# Patient Record
Sex: FEMALE | Race: BLACK OR AFRICAN AMERICAN | NOT HISPANIC OR LATINO | URBAN - METROPOLITAN AREA
[De-identification: names, ages, dates, MRNs, and addresses within clinical notes are randomized per-mention and may not be internally consistent; named-entity substitution may affect disease eponyms.]

---

## 2020-01-30 ENCOUNTER — EMERGENCY (EMERGENCY)
Facility: HOSPITAL | Age: 35
LOS: 1 days | Discharge: HOME | End: 2020-01-30
Admitting: EMERGENCY MEDICINE
Payer: OTHER MISCELLANEOUS

## 2020-01-30 VITALS
WEIGHT: 190.04 LBS | DIASTOLIC BLOOD PRESSURE: 71 MMHG | OXYGEN SATURATION: 100 % | RESPIRATION RATE: 16 BRPM | HEIGHT: 62 IN | TEMPERATURE: 99 F | SYSTOLIC BLOOD PRESSURE: 111 MMHG | HEART RATE: 86 BPM

## 2020-01-30 PROCEDURE — 99282 EMERGENCY DEPT VISIT SF MDM: CPT

## 2020-01-30 NOTE — ED PROVIDER NOTE - PATIENT PORTAL LINK FT
You can access the FollowMyHealth Patient Portal offered by [facility] by registering at the following website: [email or web address]. By joining [employer]’s FollowMyHealth portal, you will also be able to view your health information using other applications (apps) compatible with our system.

## 2020-01-30 NOTE — ED PROVIDER NOTE - CLINICAL SUMMARY MEDICAL DECISION MAKING FREE TEXT BOX
33 yo female c no sig pmhx presents to ED c/o needle stick injury to right thumb.  Source patient not high risk, unable to get tested.  Pt wants to start PEP.  Exposure packet done per protocol.

## 2020-01-30 NOTE — ED PROVIDER NOTE - OBJECTIVE STATEMENT
33 yo female c no sig pmhx presents to ED c/o needle stick injury to right thumb.  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  States picked up a used needle that wasn't caped and got stuck.  pt was wearing a glove. Site irrigated right away.  Source patient had no known history of HIV or hepatitis, was not tested and currently not in the hospital.  Tdap UTD.  Denies any other injuries.

## 2020-04-26 ENCOUNTER — MESSAGE (OUTPATIENT)
Age: 35
End: 2020-04-26

## 2020-05-08 LAB
SARS-COV-2 IGG SERPL IA-ACNC: <0.1 INDEX
SARS-COV-2 IGG SERPL QL IA: NEGATIVE

## 2020-07-21 ENCOUNTER — APPOINTMENT (OUTPATIENT)
Dept: PRIMARY CARE | Facility: HOSPITAL | Age: 35
End: 2020-07-21

## 2020-07-21 ENCOUNTER — OUTPATIENT (OUTPATIENT)
Dept: OUTPATIENT SERVICES | Facility: HOSPITAL | Age: 35
LOS: 1 days | End: 2020-07-21

## 2020-07-21 VITALS
DIASTOLIC BLOOD PRESSURE: 79 MMHG | HEART RATE: 80 BPM | SYSTOLIC BLOOD PRESSURE: 117 MMHG | HEIGHT: 62 IN | BODY MASS INDEX: 37.91 KG/M2 | WEIGHT: 206 LBS | TEMPERATURE: 98.3 F

## 2020-07-21 DIAGNOSIS — Z20.828 CONTACT WITH AND (SUSPECTED) EXPOSURE TO OTHER VIRAL COMMUNICABLE DISEASES: ICD-10-CM

## 2020-07-21 DIAGNOSIS — E66.9 OBESITY, UNSPECIFIED: ICD-10-CM

## 2020-07-21 DIAGNOSIS — M25.473 EFFUSION, UNSPECIFIED ANKLE: ICD-10-CM

## 2020-07-21 PROBLEM — Z00.00 ENCOUNTER FOR PREVENTIVE HEALTH EXAMINATION: Status: ACTIVE | Noted: 2020-07-21

## 2020-07-21 PROBLEM — Z78.9 OTHER SPECIFIED HEALTH STATUS: Chronic | Status: ACTIVE | Noted: 2020-01-30

## 2020-07-21 NOTE — PHYSICAL EXAM
[Well Developed] : well developed [No Acute Distress] : no acute distress [Well Nourished] : well nourished [PERRL] : pupils equal round and reactive to light [EOMI] : extraocular movements intact [Normal Sclera/Conjunctiva] : normal sclera/conjunctiva [No JVD] : no jugular venous distention [No Lymphadenopathy] : no lymphadenopathy [Normal Outer Ear/Nose] : the outer ears and nose were normal in appearance [Normal Oropharynx] : the oropharynx was normal [Supple] : supple [No Accessory Muscle Use] : no accessory muscle use [No Respiratory Distress] : no respiratory distress  [Regular Rhythm] : with a regular rhythm [Clear to Auscultation] : lungs were clear to auscultation bilaterally [Normal Rate] : normal rate  [No Abdominal Bruit] : a ~M bruit was not heard ~T in the abdomen [Normal S1, S2] : normal S1 and S2 [No Carotid Bruits] : no carotid bruits [Soft] : abdomen soft [No Varicosities] : no varicosities [Non Tender] : non-tender [No CVA Tenderness] : no CVA  tenderness [No Spinal Tenderness] : no spinal tenderness [de-identified] : bilateral anklle swelling -> non pitting edema, no pain upon palpation, no pain on foot extension and flexion

## 2020-07-21 NOTE — COUNSELING
[Potential consequences of obesity discussed] : Potential consequences of obesity discussed [Benefits of weight loss discussed] : Benefits of weight loss discussed [Encouraged to increase physical activity] : Encouraged to increase physical activity [Encouraged to use exercise tracking device] : Encouraged to use exercise tracking device [Weigh Self Weekly] : weigh self weekly [Encouraged to maintain food diary] : Encouraged to maintain food diary [Decrease Portions] : decrease portions [FreeTextEntry4] : 15

## 2020-07-21 NOTE — HEALTH RISK ASSESSMENT
[Yes] : Yes [No falls in past year] : Patient reported no falls in the past year [Monthly or less (1 pt)] : Monthly or less (1 point) [0] : 2) Feeling down, depressed, or hopeless: Not at all (0) [de-identified] : normal activity  [de-identified] : regular diet

## 2020-07-21 NOTE — HISTORY OF PRESENT ILLNESS
[FreeTextEntry8] : 35 F shellfish allergy, history of seasonal allergy and fibroid presented to [facility] for COVID-19 PCR test.\par \par States that the  tested + COVID-19 PCR in Dental office and received result yesterday. The co-worker who is tested + COVID-19 PCR walked in the hallway and had encountered with her in the office. The co-worker has also chronic COPD with chronic cough symptoms. She always wear surgical mask and regular mask. \par \par She denies any cough, fever, or sore throat. No loss of smell or taste, no chest tenderness or any COVID-19 GI symptoms of nausea, vomiting or diarrhea. \par \par She also reports bilateral ankle swelling since Sunday. Does not take any regular maintenance medication. Denies any chest pain, no chest palpitations or any respiratory distress.  \par \par \par \par

## 2020-07-22 ENCOUNTER — TRANSCRIPTION ENCOUNTER (OUTPATIENT)
Age: 35
End: 2020-07-22

## 2020-07-22 LAB — SARS-COV-2 RNA SPEC QL NAA+PROBE: SIGNIFICANT CHANGE UP

## 2021-04-27 ENCOUNTER — APPOINTMENT (OUTPATIENT)
Dept: OTOLARYNGOLOGY | Facility: CLINIC | Age: 36
End: 2021-04-27
Payer: COMMERCIAL

## 2021-04-27 VITALS
SYSTOLIC BLOOD PRESSURE: 110 MMHG | DIASTOLIC BLOOD PRESSURE: 77 MMHG | HEART RATE: 83 BPM | BODY MASS INDEX: 36.78 KG/M2 | HEIGHT: 62.5 IN | WEIGHT: 205 LBS

## 2021-04-27 DIAGNOSIS — H93.13 TINNITUS, BILATERAL: ICD-10-CM

## 2021-04-27 PROCEDURE — 92567 TYMPANOMETRY: CPT

## 2021-04-27 PROCEDURE — 92625 TINNITUS ASSESSMENT: CPT

## 2021-04-27 PROCEDURE — 92557 COMPREHENSIVE HEARING TEST: CPT

## 2021-04-27 PROCEDURE — 99072 ADDL SUPL MATRL&STAF TM PHE: CPT

## 2021-04-27 PROCEDURE — 92504 EAR MICROSCOPY EXAMINATION: CPT

## 2021-04-27 PROCEDURE — 99203 OFFICE O/P NEW LOW 30 MIN: CPT | Mod: 25

## 2021-04-27 NOTE — HISTORY OF PRESENT ILLNESS
[de-identified] : 34yo woman with tinnitus\par noticed it after going out/loud noise exposure\par also exposed to loud filtering units at work\par had a hard time sleeping a couple days ago\par now much better, can barely hear it'\par both ears were affected\par no changes in hearing\par otherwise healthy\par

## 2021-04-27 NOTE — DATA REVIEWED
[de-identified] : I have independently reviewed the patient's audiogram from today and my findings include normal hearing, pitch match at 8k, 55dB\par

## 2021-04-27 NOTE — PHYSICAL EXAM
[Binocular Microscopic Exam] : Binocular microscopic exam was performed [Hearing Loss Right Only] : normal [Hearing Loss Left Only] : normal [Rinne Test Air Conduction Persists > Bone Conduction Right] : air conduction greater than bone conduction on the right [Rinne Test Air Conduction Persists > Bone Conduction Left] : air conduction greater than bone conduction on the left [Hearing Rodriguez Test (Tuning Fork On Forehead)] : no lateralization of tone [Midline] : trachea located in midline position [Normal] : no rashes

## 2021-04-27 NOTE — PROCEDURE
[Same] : same as the Pre Op Dx. [] : Binocular Microscopy [FreeTextEntry1] : jovany tinnitus [FreeTextEntry4] : none [FreeTextEntry6] : Operative microscope was used to examine the ear canal, ear drum and visible middle ear landmarks. Adequate exam would not have been possible without the use of a microscope. Findings are described.\par \par

## 2021-08-02 ENCOUNTER — EMERGENCY (EMERGENCY)
Facility: HOSPITAL | Age: 36
LOS: 1 days | Discharge: HOME | End: 2021-08-02
Admitting: EMERGENCY MEDICINE
Payer: COMMERCIAL

## 2021-08-02 VITALS
RESPIRATION RATE: 18 BRPM | OXYGEN SATURATION: 98 % | HEIGHT: 62 IN | TEMPERATURE: 98 F | HEART RATE: 109 BPM | DIASTOLIC BLOOD PRESSURE: 60 MMHG | SYSTOLIC BLOOD PRESSURE: 125 MMHG

## 2021-08-02 VITALS — HEART RATE: 84 BPM

## 2021-08-02 PROCEDURE — 99282 EMERGENCY DEPT VISIT SF MDM: CPT

## 2021-08-02 NOTE — ED PROVIDER NOTE - EYE EXAM METHOD
no corneal abrasion, no foreign body seen/fluorescein/woods lamp no corneal abrasion, no foreign body seen, no injection/fluorescein/woods lamp

## 2021-08-02 NOTE — ED PROVIDER NOTE - PROGRESS NOTE DETAILS
Discussed findings of exam and plans for discharge - pt agreeable with plan. Discharge instructions, follow up recommendations and return precautions discussed with pt with stated understanding. Supervising Statement (DANNIELLE ROSARIO): I have personally seen and examined this patient. I have evaluated the ACP Fellow's note and agree except as noted. Pt denies eye pain.  No foreign bodies noted.  Principal diagnosis is foreign body sensation.

## 2021-08-02 NOTE — ED PROVIDER NOTE - OBJECTIVE STATEMENT
37 yo f with no PMH presents with foreign body sensation to right eye. Reports this started last night around 7pm while talking with her boyfriend. She attempted warm water flush from faucet and then saline drops with minimal improvement. Today while at work it was still bothering her so a colleague took a brief look and thought she might have seen a foreign body so she presents to ED. LMP7/7. Denies trauma, fever, chills, CP, SOB, discharge, blurry vision, loss of vision.

## 2021-08-02 NOTE — ED PROVIDER NOTE - NSFOLLOWUPCLINICS_GEN_ALL_ED_FT
[facility] - Ophthalmology  Ophthalmology  [address], [address]  [place] [address]  Phone: [phone]  Fax:     [facility] Ophthalmology  Ophthalmology  [address], [address]  [place] [address]  Phone: [phone]  Fax:

## 2021-08-02 NOTE — ED ADULT TRIAGE NOTE - CHIEF COMPLAINT QUOTE
Pt reporting R eye pain since yesterday, denies trauma or injury to area. No discharge or redness blurred vision or change in vision.

## 2021-08-02 NOTE — ED PROVIDER NOTE - PATIENT PORTAL LINK FT
You can access the FollowMyHealth Patient Portal offered by [facility] by registering at the following website: [email or web address]. By joining [employer]’s FollowMyHealth portal, you will also be able to view your health information using other applications (apps) compatible with our system.

## 2021-08-02 NOTE — ED PROVIDER NOTE - CLINICAL SUMMARY MEDICAL DECISION MAKING FREE TEXT BOX
37 yo f with no PMH presents with foreign body sensation to right eye. 35 yo f with no PMH presents with foreign body sensation to right eye. On exam, 20/20 with corrective glasses, no foreign body visualized, no injection, no corneal abrasion. Will instruct pt to administer hydrating drops and f/u with opthalmology

## 2021-08-02 NOTE — ED PROVIDER NOTE - NSFOLLOWUPINSTRUCTIONS_ED_ALL_ED_FT
Advance activity as tolerated.      Continue all previously prescribed medications as directed.      Instill 1 drop of lubricating eye drop to right eye up to 5 times per day as desired.    Follow up with your primary care physician in 48-72 hours- bring copies of your results.      Follow up with opthalmology this week regarding your right eye pain - see attached list    Return to the ER for worsening or persistent symptoms including fever >100.4, discharge from eye, and/or ANY NEW OR CONCERNING SYMPTOMS.     If you have issues obtaining follow up, please call: [phone] to obtain a doctor or specialist who takes your insurance in your area.  You may call [phone] to make an appointment with the internal medicine clinic.

## 2021-11-18 ENCOUNTER — RESULT REVIEW (OUTPATIENT)
Age: 36
End: 2021-11-18

## 2021-12-07 ENCOUNTER — RESULT REVIEW (OUTPATIENT)
Age: 36
End: 2021-12-07

## 2022-03-04 ENCOUNTER — RESULT REVIEW (OUTPATIENT)
Age: 37
End: 2022-03-04

## 2022-03-04 ENCOUNTER — OUTPATIENT (OUTPATIENT)
Dept: OUTPATIENT SERVICES | Facility: HOSPITAL | Age: 37
LOS: 1 days | End: 2022-03-04
Payer: COMMERCIAL

## 2022-03-04 ENCOUNTER — APPOINTMENT (OUTPATIENT)
Dept: ULTRASOUND IMAGING | Facility: IMAGING CENTER | Age: 37
End: 2022-03-04
Payer: COMMERCIAL

## 2022-03-04 ENCOUNTER — APPOINTMENT (OUTPATIENT)
Dept: MAMMOGRAPHY | Facility: IMAGING CENTER | Age: 37
End: 2022-03-04
Payer: COMMERCIAL

## 2022-03-04 DIAGNOSIS — Z00.8 ENCOUNTER FOR OTHER GENERAL EXAMINATION: ICD-10-CM

## 2022-03-04 PROCEDURE — 77066 DX MAMMO INCL CAD BI: CPT

## 2022-03-04 PROCEDURE — G0279: CPT

## 2022-03-04 PROCEDURE — 76641 ULTRASOUND BREAST COMPLETE: CPT | Mod: 26,50

## 2022-03-04 PROCEDURE — 77066 DX MAMMO INCL CAD BI: CPT | Mod: 26

## 2022-03-04 PROCEDURE — 77062 BREAST TOMOSYNTHESIS BI: CPT | Mod: 26

## 2022-03-04 PROCEDURE — 76641 ULTRASOUND BREAST COMPLETE: CPT

## 2022-03-11 ENCOUNTER — APPOINTMENT (OUTPATIENT)
Dept: ULTRASOUND IMAGING | Facility: IMAGING CENTER | Age: 37
End: 2022-03-11
Payer: COMMERCIAL

## 2022-03-11 ENCOUNTER — RESULT REVIEW (OUTPATIENT)
Age: 37
End: 2022-03-11

## 2022-03-11 ENCOUNTER — OUTPATIENT (OUTPATIENT)
Dept: OUTPATIENT SERVICES | Facility: HOSPITAL | Age: 37
LOS: 1 days | End: 2022-03-11
Payer: COMMERCIAL

## 2022-03-11 DIAGNOSIS — Z00.8 ENCOUNTER FOR OTHER GENERAL EXAMINATION: ICD-10-CM

## 2022-03-11 PROCEDURE — 77065 DX MAMMO INCL CAD UNI: CPT

## 2022-03-11 PROCEDURE — A4648: CPT

## 2022-03-11 PROCEDURE — 19083 BX BREAST 1ST LESION US IMAG: CPT

## 2022-03-11 PROCEDURE — 88305 TISSUE EXAM BY PATHOLOGIST: CPT | Mod: 26

## 2022-03-11 PROCEDURE — 77065 DX MAMMO INCL CAD UNI: CPT | Mod: 26,LT

## 2022-03-11 PROCEDURE — 88305 TISSUE EXAM BY PATHOLOGIST: CPT

## 2022-03-11 PROCEDURE — 19083 BX BREAST 1ST LESION US IMAG: CPT | Mod: LT

## 2022-08-04 ENCOUNTER — APPOINTMENT (OUTPATIENT)
Dept: PRIMARY CARE | Facility: HOSPITAL | Age: 37
End: 2022-08-04

## 2022-08-04 ENCOUNTER — OUTPATIENT (OUTPATIENT)
Dept: OUTPATIENT SERVICES | Facility: HOSPITAL | Age: 37
LOS: 1 days | End: 2022-08-04

## 2022-08-04 VITALS
DIASTOLIC BLOOD PRESSURE: 85 MMHG | HEIGHT: 62 IN | OXYGEN SATURATION: 100 % | HEART RATE: 85 BPM | TEMPERATURE: 98.1 F | WEIGHT: 200 LBS | BODY MASS INDEX: 36.8 KG/M2 | SYSTOLIC BLOOD PRESSURE: 117 MMHG

## 2022-08-04 DIAGNOSIS — S60.862A INSECT BITE (NONVENOMOUS) OF LEFT WRIST, INITIAL ENCOUNTER: ICD-10-CM
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
## 2022-08-04 DIAGNOSIS — S60.862A: ICD-10-CM

## 2022-08-04 NOTE — HISTORY OF PRESENT ILLNESS
[FreeTextEntry8] : 37-year-old female with pmhx of plantar fascitis, presents to [facility] with 1-day hx of rashes on LUE. Patient endorses onset of pruritic rashes x 2 this morning noted on L elbow and L wrist, was concerned it may be allergic reaction to Medrol started for her plantar fascitis, and came in for evaluation. Denies fever, chills, cough, SOB, CP, palpitations, GI symptoms, malaise, denies being in wooded area or contact with poison ivy.

## 2022-08-04 NOTE — PLAN
[FreeTextEntry1] : #Insect Bite\par - Encouraged to continue Medrol dosepack (being currently taken for plantar fascitis)\par - Advised to use alcohol-based solution on skin to reduce discomfort and swelling\par - Encouraged to return to worsening symptoms

## 2022-08-04 NOTE — PHYSICAL EXAM
[de-identified] : Rashed reddened isolated rash on lateral aspect of L elbow and L wrist, no grouped vesicles noted [Normal] : normal gait, coordination grossly intact, no focal deficits and deep tendon reflexes were 2+ and symmetric

## 2023-09-26 ENCOUNTER — RESULT REVIEW (OUTPATIENT)
Age: 38
End: 2023-09-26

## 2023-10-24 ENCOUNTER — RESULT REVIEW (OUTPATIENT)
Age: 38
End: 2023-10-24

## 2023-10-24 ENCOUNTER — APPOINTMENT (OUTPATIENT)
Dept: MAMMOGRAPHY | Facility: CLINIC | Age: 38
End: 2023-10-24

## 2023-10-24 ENCOUNTER — APPOINTMENT (OUTPATIENT)
Dept: ULTRASOUND IMAGING | Facility: CLINIC | Age: 38
End: 2023-10-24
Payer: COMMERCIAL

## 2023-10-24 PROCEDURE — 76641 ULTRASOUND BREAST COMPLETE: CPT | Mod: 50

## 2024-08-19 ENCOUNTER — NON-APPOINTMENT (OUTPATIENT)
Age: 39
End: 2024-08-19

## 2024-12-11 ENCOUNTER — RESULT REVIEW (OUTPATIENT)
Age: 39
End: 2024-12-11

## 2025-03-04 ENCOUNTER — RESULT REVIEW (OUTPATIENT)
Age: 40
End: 2025-03-04

## 2025-03-13 ENCOUNTER — APPOINTMENT (OUTPATIENT)
Dept: MAMMOGRAPHY | Facility: IMAGING CENTER | Age: 40
End: 2025-03-13
Payer: COMMERCIAL

## 2025-03-13 ENCOUNTER — RESULT REVIEW (OUTPATIENT)
Age: 40
End: 2025-03-13

## 2025-03-13 ENCOUNTER — OUTPATIENT (OUTPATIENT)
Dept: OUTPATIENT SERVICES | Facility: HOSPITAL | Age: 40
LOS: 1 days | End: 2025-03-13
Payer: COMMERCIAL

## 2025-03-13 DIAGNOSIS — Z00.8 ENCOUNTER FOR OTHER GENERAL EXAMINATION: ICD-10-CM

## 2025-03-13 PROCEDURE — 77067 SCR MAMMO BI INCL CAD: CPT | Mod: 26

## 2025-03-13 PROCEDURE — 77063 BREAST TOMOSYNTHESIS BI: CPT | Mod: 26

## 2025-03-13 PROCEDURE — 77063 BREAST TOMOSYNTHESIS BI: CPT

## 2025-03-13 PROCEDURE — 77067 SCR MAMMO BI INCL CAD: CPT

## 2025-04-24 ENCOUNTER — APPOINTMENT (OUTPATIENT)
Dept: OTOLARYNGOLOGY | Facility: CLINIC | Age: 40
End: 2025-04-24
Payer: COMMERCIAL

## 2025-04-24 VITALS
TEMPERATURE: 98 F | HEIGHT: 62 IN | HEART RATE: 85 BPM | OXYGEN SATURATION: 99 % | DIASTOLIC BLOOD PRESSURE: 71 MMHG | BODY MASS INDEX: 31.28 KG/M2 | SYSTOLIC BLOOD PRESSURE: 105 MMHG | WEIGHT: 170 LBS

## 2025-04-24 DIAGNOSIS — Z78.9 OTHER SPECIFIED HEALTH STATUS: ICD-10-CM

## 2025-04-24 DIAGNOSIS — L30.9 DERMATITIS, UNSPECIFIED: ICD-10-CM

## 2025-04-24 DIAGNOSIS — F17.290 NICOTINE DEPENDENCE, OTHER TOBACCO PRODUCT, UNCOMPLICATED: ICD-10-CM

## 2025-04-24 DIAGNOSIS — J35.9 CHRONIC DISEASE OF TONSILS AND ADENOIDS, UNSPECIFIED: ICD-10-CM

## 2025-04-24 PROCEDURE — 31575 DIAGNOSTIC LARYNGOSCOPY: CPT

## 2025-04-24 PROCEDURE — 99204 OFFICE O/P NEW MOD 45 MIN: CPT | Mod: 25

## 2025-04-24 RX ORDER — TRALOKINUMAB-LDRM 300 MG/2ML
300 INJECTION, SOLUTION SUBCUTANEOUS
Refills: 0 | Status: ACTIVE | COMMUNITY

## 2025-04-24 RX ORDER — RUXOLITINIB 15 MG/G
CREAM TOPICAL
Refills: 0 | Status: ACTIVE | COMMUNITY